# Patient Record
Sex: MALE | Race: BLACK OR AFRICAN AMERICAN | NOT HISPANIC OR LATINO | Employment: UNEMPLOYED | URBAN - METROPOLITAN AREA
[De-identification: names, ages, dates, MRNs, and addresses within clinical notes are randomized per-mention and may not be internally consistent; named-entity substitution may affect disease eponyms.]

---

## 2021-07-01 ENCOUNTER — PATIENT MESSAGE (OUTPATIENT)
Dept: ADMINISTRATIVE | Facility: OTHER | Age: 46
End: 2021-07-01

## 2023-06-30 ENCOUNTER — HOSPITAL ENCOUNTER (OUTPATIENT)
Dept: DATA CONVERSION | Facility: HOSPITAL | Age: 48
End: 2023-06-30
Attending: INTERNAL MEDICINE | Admitting: INTERNAL MEDICINE
Payer: COMMERCIAL

## 2023-06-30 DIAGNOSIS — K63.5 POLYP OF COLON: ICD-10-CM

## 2023-06-30 DIAGNOSIS — Z12.11 ENCOUNTER FOR SCREENING FOR MALIGNANT NEOPLASM OF COLON: ICD-10-CM

## 2023-07-10 LAB
COMPLETE PATHOLOGY REPORT: NORMAL
CONVERTED CLINICAL DIAGNOSIS-HISTORY: NORMAL
CONVERTED FINAL DIAGNOSIS: NORMAL
CONVERTED FINAL REPORT PDF LINK TO COPY AND PASTE: NORMAL
CONVERTED GROSS DESCRIPTION: NORMAL

## 2023-12-04 ENCOUNTER — HOSPITAL ENCOUNTER (OUTPATIENT)
Dept: RADIOLOGY | Facility: HOSPITAL | Age: 48
Discharge: HOME | End: 2023-12-04
Payer: COMMERCIAL

## 2023-12-04 ENCOUNTER — OFFICE VISIT (OUTPATIENT)
Dept: ORTHOPEDIC SURGERY | Facility: HOSPITAL | Age: 48
End: 2023-12-04
Payer: COMMERCIAL

## 2023-12-04 VITALS — WEIGHT: 208 LBS | BODY MASS INDEX: 31.52 KG/M2 | HEIGHT: 68 IN

## 2023-12-04 DIAGNOSIS — M25.562 LEFT KNEE PAIN, UNSPECIFIED CHRONICITY: Primary | ICD-10-CM

## 2023-12-04 DIAGNOSIS — M17.12 LOCALIZED OSTEOARTHRITIS OF LEFT KNEE: ICD-10-CM

## 2023-12-04 DIAGNOSIS — M25.562 LEFT KNEE PAIN, UNSPECIFIED CHRONICITY: ICD-10-CM

## 2023-12-04 PROCEDURE — 73564 X-RAY EXAM KNEE 4 OR MORE: CPT | Mod: LEFT SIDE | Performed by: RADIOLOGY

## 2023-12-04 PROCEDURE — 99213 OFFICE O/P EST LOW 20 MIN: CPT | Performed by: FAMILY MEDICINE

## 2023-12-04 PROCEDURE — 1036F TOBACCO NON-USER: CPT | Performed by: FAMILY MEDICINE

## 2023-12-04 PROCEDURE — 99203 OFFICE O/P NEW LOW 30 MIN: CPT | Performed by: FAMILY MEDICINE

## 2023-12-04 PROCEDURE — 73564 X-RAY EXAM KNEE 4 OR MORE: CPT | Mod: LT

## 2023-12-04 ASSESSMENT — PAIN DESCRIPTION - DESCRIPTORS: DESCRIPTORS: ACHING;PRESSURE;TIGHTNESS

## 2023-12-04 ASSESSMENT — PAIN - FUNCTIONAL ASSESSMENT: PAIN_FUNCTIONAL_ASSESSMENT: 0-10

## 2023-12-04 ASSESSMENT — PAIN SCALES - GENERAL: PAINLEVEL_OUTOF10: 2

## 2023-12-04 NOTE — PROGRESS NOTES
** Please excuse any errors in grammar or translation related to this dictation. Voice recognition software was utilized to prepare this document. **    Assessment & Plan:  Informed patient he has mild arthritic changes evident on his x-ray.  Suspect after playing basketball this weekend he had some irritability related to this underlying condition.  Overall his symptoms have improved with conservative measures.  Continue use of OTC ibuprofen or Tylenol as well as application of ice as needed to further manage his symptoms.  If has exacerbation of symptoms he can contact our clinic to schedule a follow-up appointment.  All questions answered and patient agrees this plan.      Chief complaint:  Left knee pain    HPI:  47-year-old male presents to Ortho injury clinic with left knee pain x2 days.  He states that after playing basketball with his daughter on Saturday he developed left knee pain and swelling that started a few hours after completing.  He does not recall any specific injury during the activity.  He manages symptoms with OTC analgesics and ice.  Symptoms improved yesterday and more today.  He works as a  with football players and he wanted to make sure that he did not have any ligament tear of the knee before resuming his activities.    Exam:  Left knee examined.  Small effusion.  No ecchymosis or erythema.  AROM from 0 to 130 deg with 5/5 strength. SILT overlying knee. Motion crepitus present.  No focal tenderness along the joint line or with patellar compression.  No popliteal mass palpated. Negative anterior and posterior drawer.  No laxity to varus or valgus stress at 0 or 30 deg.  No patellar apprehension. -Dusty    Results:  X-rays of left knee obtained today reviewed and independently interpreted as mild degenerative changes.  No acute fracture.

## 2024-12-30 ENCOUNTER — OFFICE VISIT (OUTPATIENT)
Dept: ORTHOPEDIC SURGERY | Facility: HOSPITAL | Age: 49
End: 2024-12-30
Payer: COMMERCIAL

## 2024-12-30 ENCOUNTER — HOSPITAL ENCOUNTER (OUTPATIENT)
Dept: RADIOLOGY | Facility: HOSPITAL | Age: 49
Discharge: HOME | End: 2024-12-30
Payer: COMMERCIAL

## 2024-12-30 VITALS — HEIGHT: 68 IN | WEIGHT: 210 LBS | BODY MASS INDEX: 31.83 KG/M2

## 2024-12-30 DIAGNOSIS — M54.50 ACUTE LEFT-SIDED LOW BACK PAIN WITHOUT SCIATICA: ICD-10-CM

## 2024-12-30 DIAGNOSIS — S39.012A LUMBAR SPINE STRAIN, INITIAL ENCOUNTER: ICD-10-CM

## 2024-12-30 PROCEDURE — 99214 OFFICE O/P EST MOD 30 MIN: CPT | Performed by: FAMILY MEDICINE

## 2024-12-30 PROCEDURE — 72110 X-RAY EXAM L-2 SPINE 4/>VWS: CPT | Performed by: RADIOLOGY

## 2024-12-30 PROCEDURE — 3008F BODY MASS INDEX DOCD: CPT | Performed by: FAMILY MEDICINE

## 2024-12-30 PROCEDURE — 1036F TOBACCO NON-USER: CPT | Performed by: FAMILY MEDICINE

## 2024-12-30 PROCEDURE — 72110 X-RAY EXAM L-2 SPINE 4/>VWS: CPT

## 2024-12-30 RX ORDER — METHOCARBAMOL 500 MG/1
1000 TABLET, FILM COATED ORAL 3 TIMES DAILY PRN
Qty: 30 TABLET | Refills: 0 | Status: SHIPPED | OUTPATIENT
Start: 2024-12-30

## 2024-12-30 RX ORDER — IBUPROFEN 800 MG/1
800 TABLET ORAL EVERY 8 HOURS PRN
Qty: 30 TABLET | Refills: 0 | Status: SHIPPED | OUTPATIENT
Start: 2024-12-30

## 2024-12-30 ASSESSMENT — PAIN - FUNCTIONAL ASSESSMENT: PAIN_FUNCTIONAL_ASSESSMENT: 0-10

## 2024-12-30 ASSESSMENT — PAIN SCALES - GENERAL: PAINLEVEL_OUTOF10: 7

## 2024-12-30 NOTE — PROGRESS NOTES
** Please excuse any errors in grammar or translation related to this dictation. Voice recognition software was utilized to prepare this document. **    Assessment & Plan:  Dx: Lumbar strain  Patient has no radicular symptoms and no red flags reported.  - Today's treatment plan: Refer to physical therapy for lower back and core strengthening program.  Advised to start with cat cow and child's pose stretches today.  Light activity as tolerated is permissible.  Started on 7 to 10-day course of ibuprofen to reduce his current pain.  Can use methocarbamol as needed for back spasms.  - Ongoing treatment: If does develop radicular symptoms which he has experienced in the past, advise he follow-up with Dr. Hall who he has prior relationship with.  - Follow-up: If condition not improving after 4 weeks of therapy, happy to see patient back for reassessment.    All questions answered and patient is agreeable to plan of care.      Chief complaint:  Lower back pain    HPI:  49-year-old male, history of right lumbar microdiscectomy in 2018 by Dr. Hall, presents with acute left-sided lower back pain.  He reports onset of pain approximately 10 days ago without preceding trauma.  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  Pain is localized to the left side of his lower back and upper buttock.  He denies radicular pain or paresthesias.  He denies fecal or urinary incontinence.  He denies saddle anesthesia.  Pain is mitigated by OTC ibuprofen.  He notes walking around is more tolerable than when seated or lying.    There is no problem list on file for this patient.    No past surgical history on file.  No current outpatient medications on file prior to visit.     No current facility-administered medications on file prior to visit.       Exam:  Lumbar Spine Exam:  Normal gait.  No gross spinal deformity observed.  No midline ttp. No step-offs.  TTP along left paraspinal  "musculature.  Non-TTP gluteal muscles, SI joint  Normal flexion and extension. Normal hip flexion.   [5]/5 strength of hip flexion (L2/L3), knee extension (L4), ankle DF (L4), EHL (L5), ankle PF (S1)  SILT in all dermatomal distributions L3-S1  [ - ] Straight leg raise  [ - ] Slump test  [ - ] REHKA pain referred to low back  [ - ] Uyen's finger test      General Exam:  Constitutional - NAD, AAO x 3, conversing appropriately.  HEENT- Normocephalic and atraumatic. No facial deformities. Hearing grossly normal.  Lungs - Breathing non-labored with normal rate. No accessory muscle use.  CV - Extremities warm and well-perfused, brisk capillary refill present.   Neuro - CN II-XII grossly intact.    Results:  Xrays of lumbar spine obtained 12/30/2024 personally interpreted as mild degenerative changes which are stable in appearance when compared to x-rays from 2017.    No results found for: \"HGBA1C\", \"CREATININE\", \"EGFR\"    "